# Patient Record
Sex: FEMALE | Race: WHITE | NOT HISPANIC OR LATINO | URBAN - METROPOLITAN AREA
[De-identification: names, ages, dates, MRNs, and addresses within clinical notes are randomized per-mention and may not be internally consistent; named-entity substitution may affect disease eponyms.]

---

## 2019-03-06 ENCOUNTER — OFFICE (OUTPATIENT)
Dept: URBAN - METROPOLITAN AREA CLINIC 101 | Facility: CLINIC | Age: 44
End: 2019-03-06

## 2019-03-06 VITALS
WEIGHT: 193 LBS | TEMPERATURE: 97.7 F | SYSTOLIC BLOOD PRESSURE: 131 MMHG | HEIGHT: 65 IN | DIASTOLIC BLOOD PRESSURE: 93 MMHG | HEART RATE: 92 BPM

## 2019-03-06 DIAGNOSIS — R14.0 ABDOMINAL DISTENSION (GASEOUS): ICD-10-CM

## 2019-03-06 DIAGNOSIS — F45.8 OTHER SOMATOFORM DISORDERS: ICD-10-CM

## 2019-03-06 DIAGNOSIS — R13.10 DYSPHAGIA, UNSPECIFIED: ICD-10-CM

## 2019-03-06 DIAGNOSIS — R10.13 EPIGASTRIC PAIN: ICD-10-CM

## 2019-03-06 PROCEDURE — 99204 OFFICE O/P NEW MOD 45 MIN: CPT

## 2019-03-06 NOTE — SERVICEHPINOTES
MAYKEL VILLASEÑOR   is a   43   year old    female who is being seen in consultation at the request of   LEONARD ADAM   for dysphagia and stomach pain. For years, had a lot of issues with the stomach. About 2-3 years, has been experiencing something stuck in the throat and trouble swallowing with solids. Painful swallowing with hot beverages at esophagus. Tried prevacid, Prilosec in the past which did not work. Never had an EGD. + Nausea without vomiting. Denies acid reflux. Stomach pain does not always correlate with food intake. Denies early satiety or weight loss. + Stomach bloating, especially with certain foods. BRTakes Meloxicam as needed for body pain. Knows it bother her stomach. Takes Ibuprofen as needed for pain as well.  Reports having a bowel movement everyday on BSS type 4. Occasionally 7 which is dietary related. Denies blood in stool, melena. + Rare constipation. Paternal grandmother had a history of colon cancer. Mother had polyps, not sure what age.

## 2019-04-17 ENCOUNTER — ON CAMPUS - OUTPATIENT (OUTPATIENT)
Dept: URBAN - METROPOLITAN AREA HOSPITAL 35 | Facility: HOSPITAL | Age: 44
End: 2019-04-17

## 2019-04-17 PROCEDURE — 43450 DILATE ESOPHAGUS 1/MULT PASS: CPT

## 2019-04-17 PROCEDURE — 43239 EGD BIOPSY SINGLE/MULTIPLE: CPT

## 2023-07-24 ENCOUNTER — NURSE ONLY (OUTPATIENT)
Age: 48
End: 2023-07-24

## 2023-07-24 ENCOUNTER — OFFICE VISIT (OUTPATIENT)
Age: 48
End: 2023-07-24
Payer: COMMERCIAL

## 2023-07-24 VITALS
SYSTOLIC BLOOD PRESSURE: 122 MMHG | HEART RATE: 90 BPM | WEIGHT: 174 LBS | TEMPERATURE: 98.5 F | DIASTOLIC BLOOD PRESSURE: 88 MMHG | OXYGEN SATURATION: 98 % | RESPIRATION RATE: 20 BRPM | BODY MASS INDEX: 28.99 KG/M2 | HEIGHT: 65 IN

## 2023-07-24 DIAGNOSIS — K21.9 GASTROESOPHAGEAL REFLUX DISEASE, UNSPECIFIED WHETHER ESOPHAGITIS PRESENT: ICD-10-CM

## 2023-07-24 DIAGNOSIS — E03.9 ACQUIRED HYPOTHYROIDISM: Primary | ICD-10-CM

## 2023-07-24 DIAGNOSIS — M32.8 OTHER FORMS OF SYSTEMIC LUPUS ERYTHEMATOSUS, UNSPECIFIED ORGAN INVOLVEMENT STATUS (HCC): ICD-10-CM

## 2023-07-24 DIAGNOSIS — J30.9 ALLERGIC RHINITIS, UNSPECIFIED SEASONALITY, UNSPECIFIED TRIGGER: ICD-10-CM

## 2023-07-24 DIAGNOSIS — Z12.31 ENCOUNTER FOR SCREENING MAMMOGRAM FOR MALIGNANT NEOPLASM OF BREAST: ICD-10-CM

## 2023-07-24 DIAGNOSIS — Z79.899 ENCOUNTER FOR LONG-TERM (CURRENT) USE OF MEDICATIONS: ICD-10-CM

## 2023-07-24 DIAGNOSIS — G43.909 MIGRAINE WITHOUT STATUS MIGRAINOSUS, NOT INTRACTABLE, UNSPECIFIED MIGRAINE TYPE: ICD-10-CM

## 2023-07-24 DIAGNOSIS — M79.7 FIBROMYALGIA: ICD-10-CM

## 2023-07-24 DIAGNOSIS — M35.00 SJOGREN'S SYNDROME WITHOUT EXTRAGLANDULAR INVOLVEMENT (HCC): ICD-10-CM

## 2023-07-24 DIAGNOSIS — Z12.11 SCREEN FOR COLON CANCER: ICD-10-CM

## 2023-07-24 DIAGNOSIS — R20.2 NUMBNESS AND TINGLING: ICD-10-CM

## 2023-07-24 DIAGNOSIS — K76.0 FATTY LIVER: ICD-10-CM

## 2023-07-24 DIAGNOSIS — R20.0 NUMBNESS AND TINGLING: ICD-10-CM

## 2023-07-24 PROCEDURE — 99203 OFFICE O/P NEW LOW 30 MIN: CPT | Performed by: FAMILY MEDICINE

## 2023-07-24 RX ORDER — LIOTHYRONINE SODIUM 5 UG/1
TABLET ORAL
COMMUNITY
Start: 2016-08-04

## 2023-07-24 RX ORDER — AMITRIPTYLINE HYDROCHLORIDE 10 MG/1
TABLET, FILM COATED ORAL
COMMUNITY

## 2023-07-24 RX ORDER — LEVOTHYROXINE SODIUM 0.07 MG/1
TABLET ORAL
COMMUNITY
Start: 2020-12-07

## 2023-07-24 RX ORDER — OMEPRAZOLE 40 MG/1
40 CAPSULE, DELAYED RELEASE ORAL DAILY
COMMUNITY

## 2023-07-24 RX ORDER — ALBUTEROL SULFATE 90 UG/1
AEROSOL, METERED RESPIRATORY (INHALATION)
COMMUNITY
Start: 2020-11-20

## 2023-07-24 RX ORDER — CETIRIZINE HYDROCHLORIDE 10 MG/1
10 TABLET ORAL DAILY
COMMUNITY

## 2023-07-24 RX ORDER — HYDROXYCHLOROQUINE SULFATE 200 MG/1
200 TABLET, FILM COATED ORAL 2 TIMES DAILY
COMMUNITY

## 2023-07-24 RX ORDER — AZELASTINE HCL 205.5 UG/1
SPRAY NASAL PRN
COMMUNITY
End: 2023-07-24 | Stop reason: SDUPTHER

## 2023-07-24 RX ORDER — ERGOCALCIFEROL 1.25 MG/1
CAPSULE ORAL
COMMUNITY
Start: 2023-06-05

## 2023-07-24 RX ORDER — AZELASTINE HCL 205.5 UG/1
2 SPRAY NASAL 2 TIMES DAILY
Qty: 30 ML | Refills: 5 | Status: SHIPPED | OUTPATIENT
Start: 2023-07-24

## 2023-07-24 RX ORDER — FLUTICASONE PROPIONATE 50 MCG
SPRAY, SUSPENSION (ML) NASAL
COMMUNITY

## 2023-07-24 RX ORDER — CYCLOBENZAPRINE HCL 5 MG
TABLET ORAL
COMMUNITY
Start: 2023-05-29

## 2023-07-24 RX ORDER — VIT C/B6/B5/MAGNESIUM/HERB 173 50-5-6-5MG
1500 CAPSULE ORAL DAILY
COMMUNITY

## 2023-07-24 RX ORDER — ELETRIPTAN HYDROBROMIDE 40 MG/1
TABLET, FILM COATED ORAL
Qty: 9 TABLET | Refills: 5 | Status: SHIPPED | OUTPATIENT
Start: 2023-07-24

## 2023-07-24 RX ORDER — ELETRIPTAN HYDROBROMIDE 40 MG/1
TABLET, FILM COATED ORAL
COMMUNITY
Start: 2021-01-22 | End: 2023-07-24 | Stop reason: SDUPTHER

## 2023-07-24 SDOH — ECONOMIC STABILITY: INCOME INSECURITY: HOW HARD IS IT FOR YOU TO PAY FOR THE VERY BASICS LIKE FOOD, HOUSING, MEDICAL CARE, AND HEATING?: NOT HARD AT ALL

## 2023-07-24 SDOH — ECONOMIC STABILITY: FOOD INSECURITY: WITHIN THE PAST 12 MONTHS, THE FOOD YOU BOUGHT JUST DIDN'T LAST AND YOU DIDN'T HAVE MONEY TO GET MORE.: NEVER TRUE

## 2023-07-24 SDOH — ECONOMIC STABILITY: HOUSING INSECURITY
IN THE LAST 12 MONTHS, WAS THERE A TIME WHEN YOU DID NOT HAVE A STEADY PLACE TO SLEEP OR SLEPT IN A SHELTER (INCLUDING NOW)?: NO

## 2023-07-24 SDOH — ECONOMIC STABILITY: FOOD INSECURITY: WITHIN THE PAST 12 MONTHS, YOU WORRIED THAT YOUR FOOD WOULD RUN OUT BEFORE YOU GOT MONEY TO BUY MORE.: NEVER TRUE

## 2023-07-24 ASSESSMENT — PATIENT HEALTH QUESTIONNAIRE - PHQ9
2. FEELING DOWN, DEPRESSED OR HOPELESS: 0
SUM OF ALL RESPONSES TO PHQ QUESTIONS 1-9: 0
SUM OF ALL RESPONSES TO PHQ QUESTIONS 1-9: 0
SUM OF ALL RESPONSES TO PHQ9 QUESTIONS 1 & 2: 0
SUM OF ALL RESPONSES TO PHQ QUESTIONS 1-9: 0
1. LITTLE INTEREST OR PLEASURE IN DOING THINGS: 0
SUM OF ALL RESPONSES TO PHQ QUESTIONS 1-9: 0

## 2023-07-25 LAB
ALBUMIN SERPL-MCNC: 3.8 G/DL (ref 3.5–5)
ALBUMIN/GLOB SERPL: 1.4 (ref 1.1–2.2)
ALP SERPL-CCNC: 52 U/L (ref 45–117)
ALT SERPL-CCNC: 23 U/L (ref 12–78)
ANION GAP SERPL CALC-SCNC: 6 MMOL/L (ref 5–15)
AST SERPL-CCNC: 11 U/L (ref 15–37)
BASOPHILS # BLD: 0 K/UL (ref 0–0.1)
BASOPHILS NFR BLD: 1 % (ref 0–1)
BILIRUB SERPL-MCNC: 0.3 MG/DL (ref 0.2–1)
BUN SERPL-MCNC: 17 MG/DL (ref 6–20)
BUN/CREAT SERPL: 20 (ref 12–20)
CALCIUM SERPL-MCNC: 9.1 MG/DL (ref 8.5–10.1)
CHLORIDE SERPL-SCNC: 107 MMOL/L (ref 97–108)
CO2 SERPL-SCNC: 28 MMOL/L (ref 21–32)
CREAT SERPL-MCNC: 0.86 MG/DL (ref 0.55–1.02)
DIFFERENTIAL METHOD BLD: ABNORMAL
EOSINOPHIL # BLD: 0.2 K/UL (ref 0–0.4)
EOSINOPHIL NFR BLD: 4 % (ref 0–7)
ERYTHROCYTE [DISTWIDTH] IN BLOOD BY AUTOMATED COUNT: 12.4 % (ref 11.5–14.5)
GLOBULIN SER CALC-MCNC: 2.8 G/DL (ref 2–4)
GLUCOSE SERPL-MCNC: 83 MG/DL (ref 65–100)
HCT VFR BLD AUTO: 40.8 % (ref 35–47)
HGB BLD-MCNC: 13.1 G/DL (ref 11.5–16)
IMM GRANULOCYTES # BLD AUTO: 0 K/UL (ref 0–0.04)
IMM GRANULOCYTES NFR BLD AUTO: 0 % (ref 0–0.5)
LYMPHOCYTES # BLD: 1.7 K/UL (ref 0.8–3.5)
LYMPHOCYTES NFR BLD: 42 % (ref 12–49)
MCH RBC QN AUTO: 30.6 PG (ref 26–34)
MCHC RBC AUTO-ENTMCNC: 32.1 G/DL (ref 30–36.5)
MCV RBC AUTO: 95.3 FL (ref 80–99)
MONOCYTES # BLD: 0.4 K/UL (ref 0–1)
MONOCYTES NFR BLD: 9 % (ref 5–13)
NEUTS SEG # BLD: 1.7 K/UL (ref 1.8–8)
NEUTS SEG NFR BLD: 44 % (ref 32–75)
NRBC # BLD: 0 K/UL (ref 0–0.01)
NRBC BLD-RTO: 0 PER 100 WBC
PLATELET # BLD AUTO: 182 K/UL (ref 150–400)
PMV BLD AUTO: 12.1 FL (ref 8.9–12.9)
POTASSIUM SERPL-SCNC: 4.7 MMOL/L (ref 3.5–5.1)
PROT SERPL-MCNC: 6.6 G/DL (ref 6.4–8.2)
RBC # BLD AUTO: 4.28 M/UL (ref 3.8–5.2)
SODIUM SERPL-SCNC: 141 MMOL/L (ref 136–145)
WBC # BLD AUTO: 3.9 K/UL (ref 3.6–11)

## 2023-07-26 ENCOUNTER — TELEPHONE (OUTPATIENT)
Age: 48
End: 2023-07-26

## 2023-07-26 NOTE — TELEPHONE ENCOUNTER
----- Message from Adam Fernandez MD sent at 7/25/2023  8:43 PM EDT -----  Labs show normal blood cell counts. Normal electrolytes, blood sugar, kidney, and liver function tests.

## 2023-09-07 ENCOUNTER — TELEPHONE (OUTPATIENT)
Age: 48
End: 2023-09-07

## 2023-09-07 RX ORDER — LEVOTHYROXINE SODIUM 0.07 MG/1
75 TABLET ORAL DAILY
Qty: 90 TABLET | Refills: 0 | Status: CANCELLED | OUTPATIENT
Start: 2023-09-07

## 2023-09-07 RX ORDER — LIOTHYRONINE SODIUM 5 UG/1
5 TABLET ORAL DAILY
Qty: 90 TABLET | Refills: 0 | Status: CANCELLED | OUTPATIENT
Start: 2023-09-07

## 2023-09-07 NOTE — TELEPHONE ENCOUNTER
Please call pt to double check her dose of Cytomel. Is it 10 mcg which would be 2 x 5 mcg tab daily? There is no 10 mcg tab for Cytomel. Does she use generic?

## 2023-09-08 ENCOUNTER — PATIENT MESSAGE (OUTPATIENT)
Age: 48
End: 2023-09-08

## 2023-09-08 DIAGNOSIS — E03.9 ACQUIRED HYPOTHYROIDISM: Primary | ICD-10-CM

## 2023-09-12 RX ORDER — LIOTHYRONINE SODIUM 5 UG/1
5 TABLET ORAL 2 TIMES DAILY
Qty: 180 TABLET | Refills: 1 | Status: SHIPPED | OUTPATIENT
Start: 2023-09-12

## 2023-09-12 RX ORDER — LEVOTHYROXINE SODIUM 0.07 MG/1
TABLET ORAL
Qty: 30 TABLET | Status: CANCELLED | OUTPATIENT
Start: 2023-09-12

## 2023-09-12 RX ORDER — LEVOTHYROXINE SODIUM 75 MCG
75 TABLET ORAL DAILY
Qty: 90 TABLET | Refills: 1 | Status: SHIPPED | OUTPATIENT
Start: 2023-09-12

## 2023-09-13 NOTE — TELEPHONE ENCOUNTER
----- Message from [address] sent at 9/12/2023  3:03 PM EDT -----  Regarding: Medications  Contact: [phone]  Hi there I apologize it is 5 mcg and take 2 daily. I uselly get generic  in the Cytomel. I take Synthroid Brand name only 75 mcg. 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 I apologize not being able to answer calls.
Refills sent to [facility].
Detailed exam

## 2023-09-25 ENCOUNTER — HOSPITAL ENCOUNTER (OUTPATIENT)
Facility: HOSPITAL | Age: 48
Discharge: HOME | End: 2023-09-28
Attending: FAMILY MEDICINE
Payer: COMMERCIAL

## 2023-09-25 DIAGNOSIS — Z12.31 ENCOUNTER FOR SCREENING MAMMOGRAM FOR MALIGNANT NEOPLASM OF BREAST: ICD-10-CM

## 2023-09-25 PROCEDURE — 77063 BREAST TOMOSYNTHESIS BI: CPT

## 2023-10-17 ENCOUNTER — TELEPHONE (OUTPATIENT)
Age: 48
End: 2023-10-17

## 2023-10-26 ENCOUNTER — TELEPHONE (OUTPATIENT)
Age: 48
End: 2023-10-26

## 2023-10-26 DIAGNOSIS — J30.9 ALLERGIC RHINITIS, UNSPECIFIED SEASONALITY, UNSPECIFIED TRIGGER: Primary | ICD-10-CM

## 2023-10-26 RX ORDER — IPRATROPIUM BROMIDE 42 UG/1
2 SPRAY, METERED NASAL 4 TIMES DAILY
Qty: 15 ML | Refills: 3 | Status: SHIPPED | OUTPATIENT
Start: 2023-10-26

## 2023-10-27 ENCOUNTER — TELEPHONE (OUTPATIENT)
Age: 48
End: 2023-10-27

## 2023-11-06 ENCOUNTER — OFFICE VISIT (OUTPATIENT)
Age: 48
End: 2023-11-06

## 2023-11-06 VITALS
HEART RATE: 90 BPM | RESPIRATION RATE: 18 BRPM | TEMPERATURE: 98.7 F | OXYGEN SATURATION: 98 % | WEIGHT: 171 LBS | DIASTOLIC BLOOD PRESSURE: 70 MMHG | HEIGHT: 65 IN | SYSTOLIC BLOOD PRESSURE: 132 MMHG | BODY MASS INDEX: 28.49 KG/M2

## 2023-11-06 DIAGNOSIS — L03.211 CELLULITIS OF FACE: Primary | ICD-10-CM

## 2023-11-06 RX ORDER — FLUCONAZOLE 150 MG/1
150 TABLET ORAL ONCE
Qty: 1 TABLET | Refills: 0 | Status: SHIPPED | OUTPATIENT
Start: 2023-11-06 | End: 2023-11-06

## 2023-11-06 RX ORDER — DOXYCYCLINE HYCLATE 100 MG
100 TABLET ORAL 2 TIMES DAILY
Qty: 20 TABLET | Refills: 0 | Status: SHIPPED | OUTPATIENT
Start: 2023-11-06 | End: 2023-11-16

## 2023-11-06 ASSESSMENT — ENCOUNTER SYMPTOMS
COLOR CHANGE: 1
EYE PAIN: 0

## 2023-11-06 NOTE — PROGRESS NOTES
Subjective     Chief Complaint   Patient presents with    Acne     Covid pos Wednesday, has bump on bridge of the nose, swelling between the eyes that has been going to the lower part of the face noticed when got sick, bothering the eyes making them feel itchy and says the area is very painful        Patient ID:  Treasure Rainey is a 57 y.o. female. Patient began having COVID symptoms last week and tested positive on Wednesday. She states that this week she noticed an area of redness on the bridge of her nose that has had some drainage. She felt as if her face were huang this morning. She has a history of MRSA and works in a hospital.          Review of Systems   Constitutional:  Negative for chills and fever. Eyes:  Negative for pain. Skin:  Positive for color change and wound. Past Medical History:   Diagnosis Date    Fibromyalgia     H/O Sjogren's disease ([address])     Hashimoto's disease     Lupus ([address])        Past Surgical History:   Procedure Laterality Date    LAPAROSCOPY      RIght overy and tube removed and endeometriosis removal       Family History   Problem Relation Age of Onset    High Blood Pressure Mother     COPD Mother     Rheum Arthritis Mother     High Blood Pressure Father        Allergies   Allergen Reactions    Latex Rash     Red rash and itching  Red rash and itching      Morphine Anaphylaxis, Other (See Comments) and Shortness Of Breath     Cannot breathe  Cannot breathe      Penicillins Hives, Rash and Other (See Comments)     Childhood allergy test  In childhood  In childhood      Sulfa Antibiotics Nausea And Vomiting, Hives and Rash    Cimetidine Other (See Comments) and Hives     Does not remember   Does not remember   unkwown  unkwown      Ranitidine Hcl Nausea And Vomiting    Clindamycin Phosphate     Other Swelling     Eye dilating drops.  Eye swelling    Prednisone     Codeine Nausea And Vomiting and Other (See Comments)     migraine  Vomiting  Headache and

## 2023-11-07 ENCOUNTER — HOSPITAL ENCOUNTER (EMERGENCY)
Facility: HOSPITAL | Age: 48
Discharge: HOME | End: 2023-11-07
Attending: EMERGENCY MEDICINE
Payer: COMMERCIAL

## 2023-11-07 VITALS
OXYGEN SATURATION: 99 % | HEART RATE: 97 BPM | DIASTOLIC BLOOD PRESSURE: 94 MMHG | HEIGHT: 65 IN | BODY MASS INDEX: 28.32 KG/M2 | RESPIRATION RATE: 18 BRPM | TEMPERATURE: 98.9 F | SYSTOLIC BLOOD PRESSURE: 143 MMHG | WEIGHT: 169.97 LBS

## 2023-11-07 DIAGNOSIS — L03.211 FACIAL CELLULITIS: Primary | ICD-10-CM

## 2023-11-07 PROCEDURE — 99283 EMERGENCY DEPT VISIT LOW MDM: CPT

## 2023-11-07 RX ORDER — CETIRIZINE HYDROCHLORIDE 10 MG/1
10 TABLET ORAL DAILY
COMMUNITY

## 2023-11-07 RX ORDER — CLINDAMYCIN HYDROCHLORIDE 150 MG/1
450 CAPSULE ORAL 4 TIMES DAILY
Qty: 120 CAPSULE | Refills: 0 | Status: SHIPPED | OUTPATIENT
Start: 2023-11-07 | End: 2023-11-17

## 2023-11-07 RX ORDER — L.ACIDOPHIL/L.PLANTAR/BIFIDO 7 15B CELL
1 CAPSULE ORAL DAILY
COMMUNITY

## 2023-11-07 ASSESSMENT — ENCOUNTER SYMPTOMS
COLOR CHANGE: 1
SINUS PRESSURE: 1

## 2023-11-07 ASSESSMENT — PAIN - FUNCTIONAL ASSESSMENT: PAIN_FUNCTIONAL_ASSESSMENT: 0-10

## 2023-11-07 NOTE — ED NOTES
Dr. Kobi Moffett reviewed discharge instructions with the patient. The patient verbalized understanding. All questions and concerns were addressed. The patient declined a wheelchair and is discharged ambulatory in the care of family members with instructions and prescriptions in hand. Pt is alert and oriented x 4. Respirations are clear and unlabored.        Td Ascencio RN  11/07/23 [id number]

## 2023-11-07 NOTE — ED TRIAGE NOTES
57 year female with swelling to nose. Pt has wound for week. Pt reports that swelling and redness and advised to come to the ED. Seen at urgent care and started on Doxycycline and topical antibiotic.

## 2023-11-07 NOTE — ED PROVIDER NOTES
[facility] EMERGENCY DEPT  EMERGENCY DEPARTMENT ENCOUNTER      Pt Name: Maryanne Maza  MRN: [id number]  [address] [facility] 1975  Date of evaluation: 11/7/2023  Provider: Michael Rae MD    CHIEF COMPLAINT       Chief Complaint   Patient presents with    Wound Infection         HISTORY OF PRESENT ILLNESS   (Location/Symptom, Timing/Onset, Context/Setting, Quality, Duration, Modifying Factors, Severity)  Note limiting factors. Patient is a 26-year-old with history of autoimmune disease who is currently on Plaquenil. She comes into the emergency department because she has an infected wound on her nose that has not improved with doxycycline. Patient reports that she was seen yesterday and diagnosed with the infection, she started taking doxycycline and using mupirocin ointment and while her pain has improved the erythema has remained. When she checked in with the urgent care that prescribed the antibiotics they instructed her to come into the emergency department for evaluation. The patient denies worsening symptoms and reports that she has been on the antibiotics for approximately 24 hours. She does have a history of abscesses in the past.  Unrelatedly, the patient was recently diagnosed with a COVID-19 infection. The history is provided by the patient. Review of External Medical Records:     Nursing Notes were reviewed. REVIEW OF SYSTEMS    (2-9 systems for level 4, 10 or more for level 5)     Review of Systems   Constitutional:  Negative for chills and fever. HENT:  Positive for sinus pressure. Skin:  Positive for color change and wound. Neurological:  Positive for headaches. All other systems reviewed and are negative. Except as noted above the remainder of the review of systems was reviewed and negative.        PAST MEDICAL HISTORY     Past Medical History:   Diagnosis Date    Fibromyalgia     H/O Sjogren's disease ([address] )     Hashimoto's disease     Lupus ([address])          SURGICAL

## 2023-11-08 ENCOUNTER — TELEPHONE (OUTPATIENT)
Age: 48
End: 2023-11-08

## 2023-11-08 NOTE — TELEPHONE ENCOUNTER
Pt called and was seen in the ER last night for a bad infection on the nose and needs an ED follow up but there was nothing until 11/24 and they said she should be seen this week. Do I fit her in somewhere or do the next available?

## 2023-11-09 ENCOUNTER — TELEPHONE (OUTPATIENT)
Age: 48
End: 2023-11-09

## 2023-11-09 NOTE — TELEPHONE ENCOUNTER
Patient called to let us know that she still has Conjunctivitis, and she was not prescribed meds during her visit on Monday. Can you please send an rx for her? Thank you.

## 2023-11-20 RX ORDER — AMITRIPTYLINE HYDROCHLORIDE 10 MG/1
TABLET, FILM COATED ORAL
Qty: 90 TABLET | Refills: 0 | Status: SHIPPED | OUTPATIENT
Start: 2023-11-20

## 2024-01-21 DIAGNOSIS — E03.9 ACQUIRED HYPOTHYROIDISM: ICD-10-CM

## 2024-01-21 RX ORDER — LIOTHYRONINE SODIUM 5 UG/1
5 TABLET ORAL 2 TIMES DAILY
Qty: 180 TABLET | Refills: 1 | OUTPATIENT
Start: 2024-01-21

## 2024-01-29 ENCOUNTER — TELEPHONE (OUTPATIENT)
Age: 49
End: 2024-01-29

## 2024-01-29 ENCOUNTER — OFFICE VISIT (OUTPATIENT)
Age: 49
End: 2024-01-29
Payer: COMMERCIAL

## 2024-01-29 VITALS
SYSTOLIC BLOOD PRESSURE: 120 MMHG | OXYGEN SATURATION: 99 % | BODY MASS INDEX: 29.24 KG/M2 | WEIGHT: 173 LBS | DIASTOLIC BLOOD PRESSURE: 88 MMHG | HEART RATE: 87 BPM

## 2024-01-29 DIAGNOSIS — R51.9 CHRONIC HEADACHE WITHOUT NEW FEATURES: ICD-10-CM

## 2024-01-29 DIAGNOSIS — M54.32 SCIATIC PAIN, LEFT: ICD-10-CM

## 2024-01-29 DIAGNOSIS — G43.011 INTRACTABLE MIGRAINE WITHOUT AURA AND WITH STATUS MIGRAINOSUS: Primary | ICD-10-CM

## 2024-01-29 DIAGNOSIS — G56.03 BILATERAL CARPAL TUNNEL SYNDROME: ICD-10-CM

## 2024-01-29 DIAGNOSIS — D35.2 PITUITARY MICROADENOMA (HCC): ICD-10-CM

## 2024-01-29 DIAGNOSIS — G89.29 CHRONIC HEADACHE WITHOUT NEW FEATURES: ICD-10-CM

## 2024-01-29 DIAGNOSIS — M54.31 SCIATIC PAIN, RIGHT: ICD-10-CM

## 2024-01-29 PROCEDURE — 99204 OFFICE O/P NEW MOD 45 MIN: CPT | Performed by: PSYCHIATRY & NEUROLOGY

## 2024-01-29 RX ORDER — RIMEGEPANT SULFATE 75 MG/75MG
TABLET, ORALLY DISINTEGRATING ORAL
Qty: 16 TABLET | Refills: 5 | Status: SHIPPED | OUTPATIENT
Start: 2024-01-29

## 2024-01-29 NOTE — PATIENT INSTRUCTIONS
[place] [place] [facility] Test Result Communication    Test results are available in judo.  [facility] is the patient portal into our electronic health record.  This feature allows patients to see diagnostic test results, immunizations, allergies, past medical and surgical history, current medications, and send messages directly to providers.  Our team members at the  can provide additional information and assist with registration.  The judo support team can be reached at [phone].    In some cases, a provider might need time to explain the results in detail during a follow-up appointment.  This might include additional information or context that will help patients understand the reason for next steps in the plan of care recommended by their provider.    If a patient chooses to receive diagnostic testing at an imaging center outside of the [facility] network, it is the patient's responsibility to bring the imaging report and disc to their [facility] follow-up appointment.    If the test results reveal anything that is particularly noteworthy, we will contact you to discuss the matter and, if necessary, schedule a follow-up appointment at an earlier date.    If you have not received your test results by judo or other communication within 7 days, please contact our office.  An inquiry can be sent to your provider using judo.  Alternatively, appointments can be scheduled via telephone to review results.  If a follow-up appointment to review results has not been scheduled, Our [facility] office can be reached at [phone].  For appointments at our [place] or [place] office, please call [phone].       PRESCRIPTION REFILL POLICY    [facility]   Statement to Patients  April 1, 2014      In an effort to ensure the large volume of patient prescription refills is processed in the most efficient and expeditious

## 2024-01-29 NOTE — TELEPHONE ENCOUNTER
Re: [employer]    Created case in CMM Key# [id number], submitted and rcvd message back that med is available without auth, sent update to nurse.

## 2024-01-29 NOTE — PROGRESS NOTES
Migraines, currently taking elavil  Said they come in like clusters  Said injectables were too expensive  Has seen neuro, unsure who said it was in [place]  Mentions if she laughs too hard will have a pressure in the back of her head  Numbness left leg, sciatic area

## 2024-01-29 NOTE — PROGRESS NOTES
[facility] at [facility] at [address] [place] [address] [address] [place] [address] [phone] Office  [phone] Facsimile           Referring: Yessy Lugo MD  [address] [place] [address]     Chief Complaint   Patient presents with    New Patient     Migraines and numbness in toes and fingers     48-year-old lady presents today for neurologic consultation regarding migraine headache.  She began having migraines as a child.  Over the years they have worsened.  She notes typically they are in the back of the head and neck and her forehead.  Intense pain with associated photophobia phonophobia nausea and vomiting.  She gets at least 2 a week and they can last days but on average last at least 24 hours.  She has family history of migraine with her mother.  No focal deficits.  Relpax is ineffective.  She does have a history of pituitary microadenoma discovered on MRI in 2011.  She says back then she saw a neurosurgeon she was getting yearly follow-up.  They were discussing medications.  She then was lost to follow-up.  She has not had a follow-up MRI.  She is concerned because she is now having a new type of headache where if she laughs or bends over she gets intense pain in the back of her head that builds into the point that she just has to stop in her tracks.    Previous abortives prescribed  Imitrex  Maxalt  Relpax    Previous preventatives prescribed  An injectable but too expensive  Propranolol  Elavil    Additionally she has sciatic pain ongoing and worsening.  Left greater than right.  No weakness.  She has numbness and tingling of her hands.  They will awaken her at night.  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  She gets is when she is driving.

## 2024-02-06 ENCOUNTER — TELEPHONE (OUTPATIENT)
Age: 49
End: 2024-02-06

## 2024-02-06 NOTE — TELEPHONE ENCOUNTER
Patient is requesting a call to try and r/s her EMG on 02/15 for later that day or the next day (02/16).     Please contact. States she is working so might miss call. Please leave voicemail with details of appt.

## 2024-02-07 DIAGNOSIS — J30.9 ALLERGIC RHINITIS, UNSPECIFIED SEASONALITY, UNSPECIFIED TRIGGER: ICD-10-CM

## 2024-02-07 RX ORDER — IPRATROPIUM BROMIDE 42 UG/1
2 SPRAY, METERED NASAL 4 TIMES DAILY
Qty: 15 ML | Refills: 3 | Status: SHIPPED | OUTPATIENT
Start: 2024-02-07

## 2024-02-09 ENCOUNTER — PROCEDURE VISIT (OUTPATIENT)
Age: 49
End: 2024-02-09

## 2024-02-09 DIAGNOSIS — R20.2 PARESTHESIA: Primary | ICD-10-CM

## 2024-02-09 NOTE — PROGRESS NOTES
EMG/ NCS Report  [facility] [address], [address]  [place]  [address]   Ph: [phone]   FAX: [phone]/ [phone]  Test Date:  2019      Test Date:  2024    Patient: IGNACIO CASTREJON : 1975 Physician: aSmi Lyon MD   Sex: Female Height: ' \" Ref Phys: EFREN HE MD   ID#: [id number] Weight:  lbs. Technician: Ct Arora     Patient History / Exam:  Patient comes in with numbness of legs with lower back pain. (+) Fibromyalgia    Patient is coming for neuropathy evaluation.          EMG & NCV Findings:  Evaluation of the left Fibular motor and the right Fibular motor nerves showed normal distal onset latency (L6.3, R4.3 ms), normal amplitude (L3.1, R6.7 mV), normal conduction velocity (B Fib-Ankle, L53, R50 m/s), and normal conduction velocity (Poplt-B Fib, L42, R53 m/s).  The left tibial motor and the right tibial motor nerves showed normal distal onset latency (L4.7, R4.5 ms), normal amplitude (L6.8, R11.6 mV), and normal conduction velocity (Knee-Ankle, L46, R39 m/s).  The left Sup Fibular sensory and the right Sup Fibular sensory nerves showed normal distal peak latency (L2.3, R2.5 ms), normal amplitude (L31.0, R17.1 µV), and normal conduction velocity (Lower leg-Lat ankle, L56, R48 m/s).  The left sural sensory and the right sural sensory nerves showed normal distal peak latency (L3.5, R3.3 ms) and normal amplitude (L10.5, R14.2 µV).  All left vs. right side differences were within normal limits.      All F Wave latencies were within normal limits.  All F Wave left vs. right side latency differences were within normal limits.  All H Reflex left vs. right side latency differences were within normal limits.          Impression:    Patient refused needle examination due to her health concerns. Cannot exclude a lumbosacral motor radiculopathy.    Nerve conduction study of the right and left lower extremities is

## 2024-02-20 ENCOUNTER — TELEPHONE (OUTPATIENT)
Age: 49
End: 2024-02-20

## 2024-02-21 ENCOUNTER — OFFICE VISIT (OUTPATIENT)
Age: 49
End: 2024-02-21
Payer: COMMERCIAL

## 2024-02-21 VITALS
HEIGHT: 65 IN | RESPIRATION RATE: 16 BRPM | BODY MASS INDEX: 28.72 KG/M2 | TEMPERATURE: 98 F | DIASTOLIC BLOOD PRESSURE: 78 MMHG | SYSTOLIC BLOOD PRESSURE: 110 MMHG | HEART RATE: 96 BPM | WEIGHT: 172.4 LBS | OXYGEN SATURATION: 98 %

## 2024-02-21 DIAGNOSIS — R09.81 SINUS CONGESTION: Primary | ICD-10-CM

## 2024-02-21 PROCEDURE — 99214 OFFICE O/P EST MOD 30 MIN: CPT | Performed by: FAMILY MEDICINE

## 2024-02-21 RX ORDER — DOXYCYCLINE HYCLATE 100 MG
100 TABLET ORAL 2 TIMES DAILY
Qty: 14 TABLET | Refills: 0 | Status: SHIPPED | OUTPATIENT
Start: 2024-02-21 | End: 2024-02-28

## 2024-02-21 RX ORDER — FLUCONAZOLE 150 MG/1
150 TABLET ORAL
Qty: 2 TABLET | Refills: 0 | Status: SHIPPED | OUTPATIENT
Start: 2024-02-21 | End: 2024-02-27

## 2024-02-21 ASSESSMENT — PATIENT HEALTH QUESTIONNAIRE - PHQ9
SUM OF ALL RESPONSES TO PHQ QUESTIONS 1-9: 0
2. FEELING DOWN, DEPRESSED OR HOPELESS: 0
SUM OF ALL RESPONSES TO PHQ QUESTIONS 1-9: 0
1. LITTLE INTEREST OR PLEASURE IN DOING THINGS: 0
SUM OF ALL RESPONSES TO PHQ9 QUESTIONS 1 & 2: 0
SUM OF ALL RESPONSES TO PHQ QUESTIONS 1-9: 0
SUM OF ALL RESPONSES TO PHQ QUESTIONS 1-9: 0

## 2024-02-21 NOTE — PROGRESS NOTES
Identified pt with two pt identifiers(name and ).    Chief Complaint   Patient presents with    Congestion    Sinusitis     Pt had a cold and she thinks she now has a sinus infection.         Health Maintenance Due   Topic    Hepatitis B vaccine (1 of 3 - 3-dose series)    HIV screen     Hepatitis C screen     DTaP/Tdap/Td vaccine (1 - Tdap)    Cervical cancer screen     Lipids     Colorectal Cancer Screen     Flu vaccine (1)    COVID-19 Vaccine (3 - 2023-24 season)       Wt Readings from Last 3 Encounters:   24 78.2 kg (172 lb 6.4 oz)   24 78.5 kg (173 lb)   23 77.1 kg (169 lb 15.6 oz)     Temp Readings from Last 3 Encounters:   24 98 °F (36.7 °C) (Temporal)   23 98.9 °F (37.2 °C) (Tympanic)   23 98.7 °F (37.1 °C) (Oral)     BP Readings from Last 3 Encounters:   24 110/78   24 120/88   23 (!) 143/94     Pulse Readings from Last 3 Encounters:   24 96   24 87   23 97           Depression Screening:  :         2024     4:48 PM 2023     9:25 AM   PHQ-9 Questionaire   Little interest or pleasure in doing things 0 0   Feeling down, depressed, or hopeless 0 0   PHQ-9 Total Score 0 0        Fall Risk Assessment:  :          No data to display                 Abuse Screening:  :          No data to display                 Coordination of Care Questionnaire:  :     \"Have you been to the ER, urgent care clinic since your last visit?  Hospitalized since your last visit?\"    NO    “Have you seen or consulted any other health care providers outside of [facility] since your last visit?”    NO    “Have you had a colorectal cancer screening such as a colonoscopy/FIT/Cologuard?    NO      “Have you had a pap smear?”    YES - [facility]: [facility]  Nurse/CMA to request most recent records if not in the chart

## 2024-03-06 ENCOUNTER — TELEPHONE (OUTPATIENT)
Age: 49
End: 2024-03-06

## 2024-03-29 ENCOUNTER — TELEPHONE (OUTPATIENT)
Age: 49
End: 2024-03-29

## 2024-04-27 RX ORDER — OMEPRAZOLE 40 MG/1
CAPSULE, DELAYED RELEASE ORAL
Qty: 90 CAPSULE | Refills: 3 | Status: SHIPPED | OUTPATIENT
Start: 2024-04-27

## 2024-05-08 ENCOUNTER — OFFICE VISIT (OUTPATIENT)
Age: 49
End: 2024-05-08
Payer: COMMERCIAL

## 2024-05-08 VITALS
WEIGHT: 172 LBS | RESPIRATION RATE: 16 BRPM | TEMPERATURE: 98.8 F | HEIGHT: 65 IN | OXYGEN SATURATION: 98 % | SYSTOLIC BLOOD PRESSURE: 110 MMHG | HEART RATE: 88 BPM | BODY MASS INDEX: 28.66 KG/M2 | DIASTOLIC BLOOD PRESSURE: 72 MMHG

## 2024-05-08 DIAGNOSIS — Z13.220 SCREENING FOR HYPERLIPIDEMIA: ICD-10-CM

## 2024-05-08 DIAGNOSIS — Z11.59 NEED FOR HEPATITIS C SCREENING TEST: ICD-10-CM

## 2024-05-08 DIAGNOSIS — L98.9 FACIAL SKIN LESION: ICD-10-CM

## 2024-05-08 DIAGNOSIS — Z00.00 ENCOUNTER FOR WELL ADULT EXAM WITHOUT ABNORMAL FINDINGS: Primary | ICD-10-CM

## 2024-05-08 DIAGNOSIS — M32.8 OTHER FORMS OF SYSTEMIC LUPUS ERYTHEMATOSUS, UNSPECIFIED ORGAN INVOLVEMENT STATUS (HCC): ICD-10-CM

## 2024-05-08 DIAGNOSIS — Z11.4 SCREENING FOR HIV WITHOUT PRESENCE OF RISK FACTORS: ICD-10-CM

## 2024-05-08 DIAGNOSIS — Z12.11 SCREEN FOR COLON CANCER: ICD-10-CM

## 2024-05-08 DIAGNOSIS — M25.551 PAIN OF RIGHT HIP: ICD-10-CM

## 2024-05-08 DIAGNOSIS — E55.9 VITAMIN D DEFICIENCY: ICD-10-CM

## 2024-05-08 PROCEDURE — 99396 PREV VISIT EST AGE 40-64: CPT | Performed by: FAMILY MEDICINE

## 2024-05-08 NOTE — PROGRESS NOTES
Identified pt with two pt identifiers(name and ).    Chief Complaint   Patient presents with    Well Adult Visit    Discuss Labs     Would like to discuss labs from endocrinologist  Was told she had low cortisol     Pain     Right hip pain that started in March        Health Maintenance Due   Topic    Hepatitis B vaccine (1 of 3 - 3-dose series)    HIV screen     Hepatitis C screen     DTaP/Tdap/Td vaccine (1 - Tdap)    Cervical cancer screen     Lipids     Colorectal Cancer Screen     COVID-19 Vaccine (3 - 2023-24 season)       Wt Readings from Last 3 Encounters:   24 78 kg (172 lb)   24 78.2 kg (172 lb 6.4 oz)   24 78.5 kg (173 lb)     Temp Readings from Last 3 Encounters:   24 98.8 °F (37.1 °C) (Temporal)   24 98 °F (36.7 °C) (Temporal)   23 98.9 °F (37.2 °C) (Tympanic)     BP Readings from Last 3 Encounters:   24 110/72   24 110/78   24 120/88     Pulse Readings from Last 3 Encounters:   24 88   24 96   24 87           Depression Screening:  :         2024     4:48 PM 2023     9:25 AM   PHQ-9 Questionaire   Little interest or pleasure in doing things 0 0   Feeling down, depressed, or hopeless 0 0   PHQ-9 Total Score 0 0        Fall Risk Assessment:  :          No data to display                 Abuse Screening:  :          No data to display                 Coordination of Care Questionnaire:  :     \"Have you been to the ER, urgent care clinic since your last visit?  Hospitalized since your last visit?\"    NO    “Have you seen or consulted any other health care providers outside of [facility] since your last visit?”    NO    “Have you had a colorectal cancer screening such as a colonoscopy/FIT/Cologuard?    NO    No colonoscopy on file  No cologuard on file  No FIT/FOBT on file   No flexible sigmoidoscopy on file         “Have you had a pap smear?”    Dr Nate Castro     No cervical cancer screening on file

## 2024-05-08 NOTE — PATIENT INSTRUCTIONS
saturated fat free, and trans fat freeAlso scan the Nutrition Facts Label, which lists saturated fat, trans fat, and cholesterol amounts.   For products low in sodium, look for sodium free, very low sodium, low sodium, no added salt, and unsalted   Skip the salt when cooking or at the table; if food needs more flavor, get creative and try out different herbs and spices. Garlic and onion also add substantial flavor to foods.    Trim any visible fat off meat and poultry before cooking, and drain the fat off after de la garza.    Use cooking methods that require little or no added fat, such as grilling, boiling, baking, poaching, broiling, roasting, steaming, stir-frying, and sauting.    Avoid fast food and convenience food. They tend to be high in saturated and trans fat and have a lot of added salt.    Talk to a registered dietitian for individualized diet advice.      Last Reviewed: March 2011 Cathleen Gamboa MS, MPH, RD   Updated: 3/29/2011

## 2024-05-08 NOTE — PROGRESS NOTES
Well Adult Note  Name: Chey Lay Today’s Date: 2024   MRN: [id number] Sex: Female   Age: 48 y.o. Ethnicity: Non- / Non    : 1975 Race: White (non-)      Chey Lay is here for well adult exam.  History:  Discuss lab results from [facility]. Hx SLE. Followed by [facility] Dr Estrada.  Had visit with Neurology Dr Mirza for migraines and neuropathy.  Patient brought in labs from [facility] that were drawn 2024 and 2023.  Abnormal thyroid tests with low TSH.  Also elevated lipids from 2023.  Mild low WBC count.  Mild low cortisol.  Normal pituitary hormones.  She will follow-up with endocrinology later this summer.      Review of Systems   Musculoskeletal:  Positive for arthralgias (R hip strain in March. relief with Aleve.).       Allergies   Allergen Reactions    Latex Rash     Red rash and itching  Red rash and itching      Morphine Anaphylaxis, Other (See Comments) and Shortness Of Breath     Cannot breathe  Cannot breathe      Penicillins Hives, Rash and Other (See Comments)     Childhood allergy test  In childhood  In childhood      Sulfa Antibiotics Nausea And Vomiting, Hives and Rash    Cimetidine Other (See Comments) and Hives     Does not remember   Does not remember   unkwown  unkwown      Ranitidine Hcl Nausea And Vomiting    Clindamycin Phosphate     Other Swelling     Eye dilating drops. Eye swelling    Prednisone     Codeine Nausea And Vomiting and Other (See Comments)     migraine  Vomiting  Headache and vomiting  Headache and vomiting  Vomiting      Trimethoprim Nausea And Vomiting         Prior to Visit Medications    Medication Sig Taking? Authorizing Provider   omeprazole (PRILOSEC) 40 MG delayed release capsule TAKE 1 CAPSULE BY MOUTH ONCE A DAY 1/2 HOUR BEFORE BREAKFAST Yes Padmini Montague MD   ipratropium (ATROVENT) 0.06 % nasal spray 2 sprays by Each Nostril route 4 times daily Yes Yessy Lugo MD   Rimegepant Sulfate (NURTEC)

## 2024-05-09 ENCOUNTER — TELEPHONE (OUTPATIENT)
Age: 49
End: 2024-05-09

## 2024-06-05 ENCOUNTER — OFFICE VISIT (OUTPATIENT)
Age: 49
End: 2024-06-05

## 2024-06-05 VITALS
TEMPERATURE: 98.4 F | DIASTOLIC BLOOD PRESSURE: 90 MMHG | BODY MASS INDEX: 29.57 KG/M2 | SYSTOLIC BLOOD PRESSURE: 129 MMHG | HEART RATE: 87 BPM | OXYGEN SATURATION: 98 % | WEIGHT: 175 LBS

## 2024-06-05 DIAGNOSIS — J01.90 ACUTE BACTERIAL SINUSITIS: Primary | ICD-10-CM

## 2024-06-05 DIAGNOSIS — R03.0 ELEVATED BLOOD PRESSURE READING IN OFFICE WITH WHITE COAT SYNDROME, WITHOUT DIAGNOSIS OF HYPERTENSION: ICD-10-CM

## 2024-06-05 DIAGNOSIS — J02.9 SORE THROAT: ICD-10-CM

## 2024-06-05 DIAGNOSIS — B96.89 ACUTE BACTERIAL SINUSITIS: Primary | ICD-10-CM

## 2024-06-05 DIAGNOSIS — R06.02 SOB (SHORTNESS OF BREATH): ICD-10-CM

## 2024-06-05 DIAGNOSIS — T36.95XA ANTIBIOTIC-INDUCED YEAST INFECTION: ICD-10-CM

## 2024-06-05 DIAGNOSIS — B37.9 ANTIBIOTIC-INDUCED YEAST INFECTION: ICD-10-CM

## 2024-06-05 LAB
STREP PYOGENES DNA, POC: NEGATIVE
VALID INTERNAL CONTROL, POC: NORMAL

## 2024-06-05 RX ORDER — AZITHROMYCIN 250 MG/1
TABLET, FILM COATED ORAL
Qty: 6 TABLET | Refills: 0 | Status: SHIPPED | OUTPATIENT
Start: 2024-06-05 | End: 2024-06-05

## 2024-06-05 RX ORDER — ALBUTEROL SULFATE 90 UG/1
2 AEROSOL, METERED RESPIRATORY (INHALATION) EVERY 6 HOURS PRN
Qty: 18 G | Refills: 0 | Status: SHIPPED | OUTPATIENT
Start: 2024-06-05

## 2024-06-05 RX ORDER — DOXYCYCLINE HYCLATE 100 MG
100 TABLET ORAL 2 TIMES DAILY
Qty: 20 TABLET | Refills: 0 | Status: SHIPPED | OUTPATIENT
Start: 2024-06-05 | End: 2024-06-15

## 2024-06-05 RX ORDER — FLUCONAZOLE 150 MG/1
150 TABLET ORAL ONCE
Qty: 1 TABLET | Refills: 0 | Status: SHIPPED | OUTPATIENT
Start: 2024-06-05 | End: 2024-06-05

## 2024-06-05 NOTE — PROGRESS NOTES
Chey Lay (:  1975) is a 48 y.o. female,Established patient, here for evaluation of the following chief complaint(s):  Cough (Sinus pain, right ear pain, sore throat , cough, fever and chills x 1 week)       ASSESSMENT/PLAN:  1. Acute bacterial sinusitis  -     doxycycline hyclate (VIBRA-TABS) 100 MG tablet; Take 1 tablet by mouth 2 times daily for 10 days, Disp-20 tablet, R-0Normal  2. Sore throat  -     AMB POC STREP GO A DIRECT, DNA PROBE  -     Strep A culture, throat; Future  3. SOB (shortness of breath)  -     XR CHEST PA/LAT; Future  -     albuterol sulfate HFA (PROVENTIL;VENTOLIN;PROAIR) 108 (90 Base) MCG/ACT inhaler; Inhale 2 puffs into the lungs every 6 hours as needed for Wheezing, Disp-18 g, R-0Normal  4. Antibiotic-induced yeast infection  -     fluconazole (DIFLUCAN) 150 MG tablet; Take 1 tablet by mouth once for 1 dose, Disp-1 tablet, R-0Normal        Results for orders placed or performed in visit on 24   AMB POC STREP GO A DIRECT, DNA PROBE   Result Value Ref Range    Valid Internal Control, POC VALID     Strep pyogenes DNA, POC Negative    Call or return to clinic if no improvement or any worsening  Patient comfortable with plan     XR CHEST PA/LAT   FINDINGS:     No focal airspace consolidation.     No pleural effusion evident.      No pneumothroax evident.      The cardiomediastinal silhouette is within normal limits.     No acute bony abnormality.      Soft tissues are unremarkable.     IMPRESSION:     No acute pulmonary findings.      SUBJECTIVE/OBJECTIVE:      48 y.o. female presents with symptoms of Ear Pain  Patient complains of ear pain and possible ear infection. Symptoms include bilateral ear drainage  and bilateral ear pain. Onset of symptoms was a few days ago, gradually worsening since that time. She also c/o 2 weeks of  congestion, facial pain, nasal congestion, non productive cough, post nasal drip, sinus pressure, and sore throat.  She is drinking plenty of

## 2024-06-08 LAB — S PYO THROAT QL CULT: ABNORMAL

## 2024-07-11 ENCOUNTER — TELEPHONE (OUTPATIENT)
Age: 49
End: 2024-07-11

## 2024-07-17 ENCOUNTER — TELEPHONE (OUTPATIENT)
Age: 49
End: 2024-07-17

## 2024-07-17 ENCOUNTER — OFFICE VISIT (OUTPATIENT)
Age: 49
End: 2024-07-17
Payer: COMMERCIAL

## 2024-07-17 VITALS
HEIGHT: 65 IN | TEMPERATURE: 98.1 F | RESPIRATION RATE: 16 BRPM | SYSTOLIC BLOOD PRESSURE: 112 MMHG | DIASTOLIC BLOOD PRESSURE: 84 MMHG | HEART RATE: 96 BPM | BODY MASS INDEX: 28.06 KG/M2 | WEIGHT: 168.4 LBS | OXYGEN SATURATION: 98 %

## 2024-07-17 DIAGNOSIS — M25.551 PAIN OF RIGHT HIP: Primary | ICD-10-CM

## 2024-07-17 DIAGNOSIS — R30.0 DYSURIA: ICD-10-CM

## 2024-07-17 LAB
BILIRUBIN, URINE, POC: ABNORMAL
BLOOD URINE, POC: NEGATIVE
GLUCOSE URINE, POC: ABNORMAL
KETONES, URINE, POC: ABNORMAL
LEUKOCYTE ESTERASE, URINE, POC: ABNORMAL
NITRITE, URINE, POC: NEGATIVE
PH, URINE, POC: 6.5 (ref 4.6–8)
PROTEIN,URINE, POC: ABNORMAL
SPECIFIC GRAVITY, URINE, POC: 1.01 (ref 1–1.03)
URINALYSIS CLARITY, POC: CLEAR
URINALYSIS COLOR, POC: YELLOW
UROBILINOGEN, POC: ABNORMAL

## 2024-07-17 PROCEDURE — 81003 URINALYSIS AUTO W/O SCOPE: CPT | Performed by: FAMILY MEDICINE

## 2024-07-17 PROCEDURE — 99213 OFFICE O/P EST LOW 20 MIN: CPT | Performed by: FAMILY MEDICINE

## 2024-07-17 RX ORDER — NAPROXEN 500 MG/1
500 TABLET ORAL 2 TIMES DAILY WITH MEALS
Qty: 30 TABLET | Refills: 0 | Status: SHIPPED | OUTPATIENT
Start: 2024-07-17

## 2024-07-17 RX ORDER — ACETAMINOPHEN 500 MG
1000 TABLET ORAL 3 TIMES DAILY
Qty: 180 TABLET | Refills: 0 | Status: SHIPPED | OUTPATIENT
Start: 2024-07-17

## 2024-07-17 ASSESSMENT — PATIENT HEALTH QUESTIONNAIRE - PHQ9
SUM OF ALL RESPONSES TO PHQ QUESTIONS 1-9: 0
SUM OF ALL RESPONSES TO PHQ9 QUESTIONS 1 & 2: 0
SUM OF ALL RESPONSES TO PHQ QUESTIONS 1-9: 0
1. LITTLE INTEREST OR PLEASURE IN DOING THINGS: NOT AT ALL
2. FEELING DOWN, DEPRESSED OR HOPELESS: NOT AT ALL
SUM OF ALL RESPONSES TO PHQ QUESTIONS 1-9: 0
SUM OF ALL RESPONSES TO PHQ QUESTIONS 1-9: 0

## 2024-07-17 NOTE — PROGRESS NOTES
Identified pt with two pt identifiers(name and ).    Chief Complaint   Patient presents with    Back Pain     She c/o of lower back pain and hip (bursa) area.         Health Maintenance Due   Topic    Hepatitis B vaccine (1 of 3 - 3-dose series)    DTaP/Tdap/Td vaccine (1 - Tdap)    Lipids     Colorectal Cancer Screen     COVID-19 Vaccine (3 - 2023-24 season)       Wt Readings from Last 3 Encounters:   24 79.4 kg (175 lb)   24 78 kg (172 lb)   24 78.2 kg (172 lb 6.4 oz)     Temp Readings from Last 3 Encounters:   24 98.4 °F (36.9 °C) (Oral)   24 98.8 °F (37.1 °C) (Temporal)   24 98 °F (36.7 °C) (Temporal)     BP Readings from Last 3 Encounters:   24 (!) 129/90   24 110/72   24 110/78     Pulse Readings from Last 3 Encounters:   24 87   24 88   24 96           Depression Screening:  :         2024     3:57 PM 2024     4:48 PM 2023     9:25 AM   PHQ-9 Questionaire   Little interest or pleasure in doing things 0 0 0   Feeling down, depressed, or hopeless 0 0 0   PHQ-9 Total Score 0 0 0        Fall Risk Assessment:  :          No data to display                 Abuse Screening:  :          No data to display                 Coordination of Care Questionnaire:  :     \"Have you been to the ER, urgent care clinic since your last visit?  Hospitalized since your last visit?\"    NO    “Have you seen or consulted any other health care providers outside of [facility] since your last visit?”    NO        “Have you had a colorectal cancer screening such as a colonoscopy/FIT/Cologuard?    NO    No colonoscopy on file  No cologuard on file  No FIT/FOBT on file   No flexible sigmoidoscopy on file         Click Here for Release of Records Request

## 2024-07-20 ASSESSMENT — ENCOUNTER SYMPTOMS
SINUS PRESSURE: 0
SORE THROAT: 0
VOMITING: 0
SINUS PAIN: 0
ABDOMINAL DISTENTION: 0
BACK PAIN: 1
COUGH: 0
CHEST TIGHTNESS: 0
SHORTNESS OF BREATH: 0
CONSTIPATION: 0
BLOOD IN STOOL: 0
ANAL BLEEDING: 0
RHINORRHEA: 0
ABDOMINAL PAIN: 0
DIARRHEA: 0
NAUSEA: 0
WHEEZING: 0

## 2024-07-20 NOTE — PROGRESS NOTES
Assessment & Plan   1. Pain of right hip  -     XR HIP BILATERAL W AP PELVIS (2 VIEWS); Future  -     XR FEMUR RIGHT (MIN 2 VIEWS); Future  -     naproxen (NAPROSYN) 500 MG tablet; Take 1 tablet by mouth 2 times daily (with meals), Disp-30 tablet, R-0Normal  -     acetaminophen (TYLENOL) 500 MG tablet; Take 2 tablets by mouth 3 times daily, Disp-180 tablet, R-0Normal  2. Dysuria  -     AMB POC URINALYSIS DIP STICK AUTO W/O MICRO       No follow-ups on file.       Subjective   Chey Lay (:  1975) is a 48 y.o. female,Established patient, here for evaluation of the following chief complaint(s):  Back Pain (She c/o of lower back pain and hip (bursa) area. )      Chey Lay is a 48 y.o. female presents with new onset hip pain, right side.     She is unable to put direct pressure on the hip with out extreme pain, denies any limited movement.     Back Pain  Pertinent negatives include no abdominal pain, chest pain, fever or pelvic pain.       Review of Systems   Constitutional:  Negative for activity change, appetite change, fatigue and fever.   HENT:  Negative for congestion, postnasal drip, rhinorrhea, sinus pressure, sinus pain, sneezing and sore throat.    Respiratory:  Negative for cough, chest tightness, shortness of breath and wheezing.    Cardiovascular:  Negative for chest pain, palpitations and leg swelling.   Gastrointestinal:  Negative for abdominal distention, abdominal pain, anal bleeding, blood in stool, constipation, diarrhea, nausea and vomiting.   Endocrine: Negative for cold intolerance, heat intolerance, polydipsia, polyphagia and polyuria.   Genitourinary:  Negative for dyspareunia, genital sores, hematuria, menstrual problem, pelvic pain, vaginal bleeding, vaginal discharge and vaginal pain.   Musculoskeletal:  Positive for back pain. Negative for arthralgias, gait problem, joint swelling and neck pain.   Skin:  Negative for pallor, rash and wound.   Allergic/Immunologic:

## 2024-07-24 ENCOUNTER — TELEPHONE (OUTPATIENT)
Age: 49
End: 2024-07-24

## 2024-07-24 NOTE — TELEPHONE ENCOUNTER
Called patient to schedule new patient appt. She stated that she will have to call back to schedule due to her potentially [place] out of [place] for work and not having insurance for the time frame of the appt.

## 2024-07-29 DIAGNOSIS — J30.9 ALLERGIC RHINITIS, UNSPECIFIED SEASONALITY, UNSPECIFIED TRIGGER: ICD-10-CM

## 2024-07-30 ENCOUNTER — TELEPHONE (OUTPATIENT)
Age: 49
End: 2024-07-30

## 2024-07-30 DIAGNOSIS — C44.320 SQUAMOUS CELL CARCINOMA, FACE: Primary | ICD-10-CM

## 2024-07-30 RX ORDER — IPRATROPIUM BROMIDE 42 UG/1
SPRAY, METERED NASAL
Qty: 15 ML | Refills: 5 | Status: SHIPPED | OUTPATIENT
Start: 2024-07-30

## 2024-07-30 NOTE — TELEPHONE ENCOUNTER
----- Message from Lesly Ceron sent at 7/30/2024  8:03 AM EDT -----  Regarding: FW: Referral Dermatologist   Contact: [phone]    ----- Message -----  From: Chey Lay  Sent: 7/29/2024  10:02 PM EDT  To: [facility] Clinical Staff  Subject: Referral Dermatologist                           Hi there I just got home today from vacation and had papers in the mail from dermatologist office. I am scheduled to have the mohs surgery this Thursday Aug 1st . I have squamous cell cancer on my face.  In the paperwork it states that I need to get a referral from my pcp for the surgeon in their office ( Dr. Ban Garcia ) . I don’t understand why the dermatologist office doesn’t do this. They haven’t mentioned this prior to me. Please get back with me as soon as possible!

## 2024-07-30 NOTE — TELEPHONE ENCOUNTER
I placed referral for Dr Ban Garcia to do Moh's surgery on skin cancer on face. Please fax referral to Dr Garcia's office.  Does she need authorization from her insurance?

## 2024-08-05 ENCOUNTER — TELEPHONE (OUTPATIENT)
Age: 49
End: 2024-08-05

## 2024-08-05 DIAGNOSIS — F41.8 TEST ANXIETY: Primary | ICD-10-CM

## 2024-08-05 NOTE — TELEPHONE ENCOUNTER
Patient is requesting  some medication to help her get through her MRI procedure on 8/17/24    Please send to :    [facility]  [phone]

## 2024-08-06 ENCOUNTER — TELEPHONE (OUTPATIENT)
Age: 49
End: 2024-08-06

## 2024-08-06 RX ORDER — DIAZEPAM 10 MG/1
TABLET ORAL
Qty: 1 TABLET | Refills: 0 | Status: SHIPPED | OUTPATIENT
Start: 2024-08-06 | End: 2025-08-07

## 2024-08-06 NOTE — TELEPHONE ENCOUNTER
----- Message from Yessy Lugo MD sent at 8/5/2024  5:30 PM EDT -----  Please request lab results from [facility] Chris Estrada in [place], [place].    Thanks.

## 2024-08-13 ENCOUNTER — PROCEDURE VISIT (OUTPATIENT)
Age: 49
End: 2024-08-13
Payer: COMMERCIAL

## 2024-08-13 DIAGNOSIS — G56.01 CARPAL TUNNEL SYNDROME OF RIGHT WRIST: Primary | ICD-10-CM

## 2024-08-13 DIAGNOSIS — G43.909 MIGRAINE WITHOUT STATUS MIGRAINOSUS, NOT INTRACTABLE, UNSPECIFIED MIGRAINE TYPE: ICD-10-CM

## 2024-08-13 PROCEDURE — 95913 NRV CNDJ TEST 13/> STUDIES: CPT | Performed by: PSYCHIATRY & NEUROLOGY

## 2024-08-13 RX ORDER — ELETRIPTAN HYDROBROMIDE 40 MG/1
TABLET, FILM COATED ORAL
Qty: 9 TABLET | Refills: 5 | Status: SHIPPED | OUTPATIENT
Start: 2024-08-13

## 2024-08-13 NOTE — PROGRESS NOTES
Sensory (5th Digit)  29.7 °C   Wrist    3.0 <4.0 77.6 >9 Wrist 5th Digit 14.0   Right Ulnar Anti Sensory (5th Digit)  29.7 °C   Wrist    2.9 <4.0 49.8 >9 Wrist 5th Digit 14.0     Motor Summary Table     Stim Site NR Onset (ms) Norm Onset (ms) O-P Amp (mV) Norm O-P Amp Amp (Prev) (%) Site1 Site2 Dist (cm) Adama (m/s) Norm Adama (m/s)   Left Median Motor (Abd Poll Brev)  30 °C   Wrist    3.0 <4.5 16.1 >4.1 100.0 Wrist Abd Poll Brev 8.0     Elbow    6.3  13.5  83.9 Elbow Wrist 18.5 56 >49   Right Median Motor (Abd Poll Brev)  30.1 °C   Wrist    4.0 <4.5 10.9 >4.1 100.0 Wrist Abd Poll Brev 8.0     Elbow    7.3  9.8  89.9 Elbow Wrist 17.0 52 >49   Left Ulnar Motor (Abd Dig Minimi)  30.4 °C   Wrist    2.6 <3.1 12.3 >7.0 100.0 Wrist Abd Dig Minimi 8.0  >50   B Elbow    5.6  12.2  99.2 B Elbow Wrist 18.0 60 >50   A Elbow    7.3  12.6  103.3 A Elbow B Elbow 10.0 59 >50   Right Ulnar Motor (Abd Dig Minimi)  30.4 °C   Wrist    2.7 <3.1 14.8 >7.0 100.0 Wrist Abd Dig Minimi 8.0  >50   B Elbow    5.5  14.4  97.3 B Elbow Wrist 17.0 61 >50   A Elbow    7.1  14.3  99.3 A Elbow B Elbow 10.0 63 >50     Comparison Summary Table     Stim Site NR Peak (ms) P-T Amp (µV) Site1 Site2 Dist (cm) Delta-0 (ms)   Left Median/Ulnar Palm Comparison (Wrist)  29.9 °C   Median Palm    1.6 100.7 Median Palm Ulnar Palm 8.0 0.1   Ulnar Palm    1.7 38.8       Right Median/Ulnar Palm Comparison (Wrist)  30.3 °C   Median Palm    2.2 52.3 Median Palm Ulnar Palm 8.0 0.5   Ulnar Palm    1.7 33.1         F Wave Studies     NR F-Lat (ms) Lat Norm (ms) L-R F-Lat (ms) L-R Lat Norm   Left Ulnar (Mrkrs) (Abd Dig Min)  30.5 °C      24.12 <32 0.44 <2.5   Right Ulnar (Mrkrs) (Abd Dig Min)  30.5 °C      24.56 <32 0.44 <2.5         Waveforms:

## 2024-08-29 ENCOUNTER — TELEPHONE (OUTPATIENT)
Age: 49
End: 2024-08-29

## 2024-08-29 NOTE — TELEPHONE ENCOUNTER
VM to pt- rescheduled for 9/19 @ 4pm. Advised to return call to the office if this appt date does not work for her. My Chart message also sent.

## 2024-09-12 ENCOUNTER — OFFICE VISIT (OUTPATIENT)
Age: 49
End: 2024-09-12
Payer: COMMERCIAL

## 2024-09-12 VITALS
BODY MASS INDEX: 27.99 KG/M2 | RESPIRATION RATE: 14 BRPM | HEART RATE: 86 BPM | DIASTOLIC BLOOD PRESSURE: 82 MMHG | HEIGHT: 65 IN | WEIGHT: 168 LBS | OXYGEN SATURATION: 97 % | SYSTOLIC BLOOD PRESSURE: 131 MMHG

## 2024-09-12 DIAGNOSIS — M54.30 SCIATIC NERVE PAIN, UNSPECIFIED LATERALITY: ICD-10-CM

## 2024-09-12 DIAGNOSIS — G93.5 CHIARI I MALFORMATION (HCC): ICD-10-CM

## 2024-09-12 DIAGNOSIS — G43.011 INTRACTABLE MIGRAINE WITHOUT AURA AND WITH STATUS MIGRAINOSUS: Primary | ICD-10-CM

## 2024-09-12 PROCEDURE — 99214 OFFICE O/P EST MOD 30 MIN: CPT | Performed by: PSYCHIATRY & NEUROLOGY

## 2024-09-12 RX ORDER — RIMEGEPANT SULFATE 75 MG/75MG
TABLET, ORALLY DISINTEGRATING ORAL
Qty: 16 TABLET | Refills: 5 | Status: SHIPPED | OUTPATIENT
Start: 2024-09-12

## 2024-09-16 ENCOUNTER — TELEPHONE (OUTPATIENT)
Age: 49
End: 2024-09-16

## 2024-09-27 ENCOUNTER — HOSPITAL ENCOUNTER (OUTPATIENT)
Facility: HOSPITAL | Age: 49
Discharge: HOME | End: 2024-09-30
Attending: FAMILY MEDICINE
Payer: COMMERCIAL

## 2024-09-27 VITALS — BODY MASS INDEX: 28.68 KG/M2 | HEIGHT: 64 IN | WEIGHT: 168 LBS

## 2024-09-27 DIAGNOSIS — Z12.31 ENCOUNTER FOR SCREENING MAMMOGRAM FOR MALIGNANT NEOPLASM OF BREAST: ICD-10-CM

## 2024-09-27 PROCEDURE — 77063 BREAST TOMOSYNTHESIS BI: CPT

## 2024-12-05 DIAGNOSIS — J30.9 ALLERGIC RHINITIS, UNSPECIFIED SEASONALITY, UNSPECIFIED TRIGGER: ICD-10-CM

## 2024-12-06 RX ORDER — IPRATROPIUM BROMIDE 42 UG/1
SPRAY, METERED NASAL
Qty: 15 ML | Refills: 1 | Status: SHIPPED | OUTPATIENT
Start: 2024-12-06

## 2024-12-19 ENCOUNTER — TELEPHONE (OUTPATIENT)
Age: 49
End: 2024-12-19

## 2024-12-19 NOTE — TELEPHONE ENCOUNTER
Her last OV was 5/8/24. She did not get the fasting labs that I ordered drawn for that visit.  She needs an OV and labs to address her medical concerns. See if she can get appt on the dates she is available over the next 2 weeks.

## 2024-12-20 NOTE — TELEPHONE ENCOUNTER
Please explain to pt that I am unable to address her medical questions without seeing her or having lab results to review.  She can transfer to a PCP in [place] if that is where she lives.

## 2024-12-30 ENCOUNTER — TELEPHONE (OUTPATIENT)
Age: 49
End: 2024-12-30

## 2024-12-30 DIAGNOSIS — Z13.89 SCREENING FOR MULTIPLE CONDITIONS: Primary | ICD-10-CM

## 2025-01-22 ENCOUNTER — COMMUNITY OUTREACH (OUTPATIENT)
Age: 50
End: 2025-01-22

## 2025-01-25 ENCOUNTER — HOSPITAL ENCOUNTER (EMERGENCY)
Facility: HOSPITAL | Age: 50
Discharge: HOME | End: 2025-01-25
Attending: EMERGENCY MEDICINE
Payer: COMMERCIAL

## 2025-01-25 VITALS
RESPIRATION RATE: 18 BRPM | TEMPERATURE: 98.7 F | HEIGHT: 64 IN | WEIGHT: 180 LBS | DIASTOLIC BLOOD PRESSURE: 85 MMHG | BODY MASS INDEX: 30.73 KG/M2 | HEART RATE: 92 BPM | SYSTOLIC BLOOD PRESSURE: 146 MMHG | OXYGEN SATURATION: 99 %

## 2025-01-25 DIAGNOSIS — R21 RASH AND OTHER NONSPECIFIC SKIN ERUPTION: Primary | ICD-10-CM

## 2025-01-25 DIAGNOSIS — L29.9 PRURITIC DISORDER: ICD-10-CM

## 2025-01-25 PROCEDURE — 99283 EMERGENCY DEPT VISIT LOW MDM: CPT

## 2025-01-25 RX ORDER — HYDROXYZINE HYDROCHLORIDE 25 MG/1
25 TABLET, FILM COATED ORAL EVERY 8 HOURS PRN
Qty: 30 TABLET | Refills: 0 | Status: SHIPPED | OUTPATIENT
Start: 2025-01-25 | End: 2025-01-25

## 2025-01-25 RX ORDER — PREDNISONE 20 MG/1
20 TABLET ORAL DAILY
Qty: 5 TABLET | Refills: 0 | Status: SHIPPED | OUTPATIENT
Start: 2025-01-25 | End: 2025-01-27 | Stop reason: SINTOL

## 2025-01-25 RX ORDER — HYDROXYZINE HYDROCHLORIDE 25 MG/1
25 TABLET, FILM COATED ORAL EVERY 8 HOURS PRN
Qty: 30 TABLET | Refills: 0 | Status: SHIPPED | OUTPATIENT
Start: 2025-01-25 | End: 2025-02-04

## 2025-01-25 RX ORDER — PREDNISONE 20 MG/1
20 TABLET ORAL DAILY
Qty: 5 TABLET | Refills: 0 | Status: SHIPPED | OUTPATIENT
Start: 2025-01-25 | End: 2025-01-25

## 2025-01-25 ASSESSMENT — PAIN - FUNCTIONAL ASSESSMENT
PAIN_FUNCTIONAL_ASSESSMENT: NONE - DENIES PAIN
PAIN_FUNCTIONAL_ASSESSMENT: ACTIVITIES ARE NOT PREVENTED
PAIN_FUNCTIONAL_ASSESSMENT: 0-10
PAIN_FUNCTIONAL_ASSESSMENT: NONE - DENIES PAIN

## 2025-01-25 ASSESSMENT — PAIN DESCRIPTION - LOCATION: LOCATION: GENERALIZED

## 2025-01-25 ASSESSMENT — PAIN DESCRIPTION - PAIN TYPE: TYPE: ACUTE PAIN

## 2025-01-25 ASSESSMENT — PAIN DESCRIPTION - DESCRIPTORS: DESCRIPTORS: PINS AND NEEDLES

## 2025-01-25 ASSESSMENT — PAIN SCALES - GENERAL: PAINLEVEL_OUTOF10: 7

## 2025-01-25 NOTE — DISCHARGE INSTRUCTIONS
You can take Atarax during the day and Benadryl at night for itching.  I recommend the steroids as directed and follow-up with your rheumatologist.  Return with any new or worsening symptoms

## 2025-01-25 NOTE — ED PROVIDER NOTES
[facility]  EMERGENCY DEPARTMENT ENCOUNTER      Pt Name: Chey Lay  MRN: [id number]  Birthdate 1975  Date of evaluation: 1/25/2025  Provider: Anthony Campa MD      HISTORY OF PRESENT ILLNESS      HPI        Nursing Notes were reviewed.    REVIEW OF SYSTEMS         Review of Systems        PAST MEDICAL HISTORY     Past Medical History:   Diagnosis Date    Chronic back pain     Fibromyalgia     GERD (gastroesophageal reflux disease)     For years    H/O Sjogren's disease (HCC)     Hashimoto's disease     Headache     Started as a child    Hearing loss     4 years or so?    Hypothyroidism 2007    Hoshimotos hypothyroidism    Lupus (HCC)     Osteoarthritis          SURGICAL HISTORY       Past Surgical History:   Procedure Laterality Date    LAPAROSCOPY      RIght overy and tube removed and endeometriosis removal         CURRENT MEDICATIONS       Current Discharge Medication List        CONTINUE these medications which have NOT CHANGED    Details   ipratropium (ATROVENT) 0.06 % nasal spray SPRAY 2 SPRAYS INTO EACH NOSTRIL 4 TIMES A DAY  Qty: 15 mL, Refills: 1    Associated Diagnoses: Allergic rhinitis, unspecified seasonality, unspecified trigger      rimegepant sulfate (NURTEC) 75 MG TBDP 1 po qod  Qty: 16 tablet, Refills: 5    Associated Diagnoses: Intractable migraine without aura and with status migrainosus      eletriptan (RELPAX) 40 MG tablet TAKE 1 TABLET AT ONSET OF HEADACHE, MAY REPEAT IN 2 HOURS  Qty: 9 tablet, Refills: 5    Associated Diagnoses: Migraine without status migrainosus, not intractable, unspecified migraine type      diazePAM (VALIUM) 10 MG tablet Take 1 tablet 45 minutes prior to MRI. Will need a .  Qty: 1 tablet, Refills: 0    Associated Diagnoses: Test anxiety      naproxen (NAPROSYN) 500 MG tablet Take 1 tablet by mouth 2 times daily (with meals)  Qty: 30 tablet, Refills: 0    Associated Diagnoses: Pain of right hip      acetaminophen (TYLENOL) 500

## 2025-01-27 ENCOUNTER — LAB (OUTPATIENT)
Age: 50
End: 2025-01-27

## 2025-01-27 ENCOUNTER — OFFICE VISIT (OUTPATIENT)
Age: 50
End: 2025-01-27
Payer: COMMERCIAL

## 2025-01-27 VITALS
RESPIRATION RATE: 16 BRPM | OXYGEN SATURATION: 97 % | DIASTOLIC BLOOD PRESSURE: 84 MMHG | HEIGHT: 65 IN | TEMPERATURE: 98.6 F | BODY MASS INDEX: 30.16 KG/M2 | WEIGHT: 181 LBS | HEART RATE: 92 BPM | SYSTOLIC BLOOD PRESSURE: 122 MMHG

## 2025-01-27 DIAGNOSIS — D35.2 PITUITARY MICROADENOMA (HCC): ICD-10-CM

## 2025-01-27 DIAGNOSIS — R21 RASH AND OTHER NONSPECIFIC SKIN ERUPTION: ICD-10-CM

## 2025-01-27 DIAGNOSIS — T78.40XD ALLERGIC REACTION, SUBSEQUENT ENCOUNTER: Primary | ICD-10-CM

## 2025-01-27 DIAGNOSIS — T78.40XD ALLERGIC REACTION, SUBSEQUENT ENCOUNTER: ICD-10-CM

## 2025-01-27 DIAGNOSIS — M32.8 OTHER FORMS OF SYSTEMIC LUPUS ERYTHEMATOSUS, UNSPECIFIED ORGAN INVOLVEMENT STATUS (HCC): ICD-10-CM

## 2025-01-27 PROCEDURE — 99213 OFFICE O/P EST LOW 20 MIN: CPT | Performed by: FAMILY MEDICINE

## 2025-01-27 RX ORDER — FAMOTIDINE 20 MG/1
20 TABLET, FILM COATED ORAL 2 TIMES DAILY
Qty: 60 TABLET | Refills: 0 | Status: SHIPPED | OUTPATIENT
Start: 2025-01-27

## 2025-01-27 RX ORDER — METHYLPREDNISOLONE 4 MG/1
TABLET ORAL
Qty: 21 TABLET | Refills: 0 | Status: SHIPPED | OUTPATIENT
Start: 2025-01-27 | End: 2025-02-02

## 2025-01-27 SDOH — ECONOMIC STABILITY: FOOD INSECURITY: WITHIN THE PAST 12 MONTHS, THE FOOD YOU BOUGHT JUST DIDN'T LAST AND YOU DIDN'T HAVE MONEY TO GET MORE.: NEVER TRUE

## 2025-01-27 SDOH — ECONOMIC STABILITY: FOOD INSECURITY: WITHIN THE PAST 12 MONTHS, YOU WORRIED THAT YOUR FOOD WOULD RUN OUT BEFORE YOU GOT MONEY TO BUY MORE.: NEVER TRUE

## 2025-01-27 ASSESSMENT — PATIENT HEALTH QUESTIONNAIRE - PHQ9
SUM OF ALL RESPONSES TO PHQ QUESTIONS 1-9: 0
SUM OF ALL RESPONSES TO PHQ9 QUESTIONS 1 & 2: 0
2. FEELING DOWN, DEPRESSED OR HOPELESS: NOT AT ALL
1. LITTLE INTEREST OR PLEASURE IN DOING THINGS: NOT AT ALL
SUM OF ALL RESPONSES TO PHQ QUESTIONS 1-9: 0

## 2025-01-27 NOTE — PROGRESS NOTES
Identified pt with two pt identifiers(name and )    Chief Complaint   Patient presents with    Rash     Rash and swelling of hands, feet, and lips that started about 6 days ago.         Health Maintenance Due   Topic    Hepatitis B vaccine (1 of 3 - 19+ 3-dose series)    DTaP/Tdap/Td vaccine (1 - Tdap)    Lipids     Colorectal Cancer Screen     Flu vaccine (1)    COVID-19 Vaccine (3 - 2023-24 season)       Wt Readings from Last 3 Encounters:   25 82.1 kg (181 lb)   25 81.6 kg (180 lb)   24 76.2 kg (168 lb)     Temp Readings from Last 3 Encounters:   25 98.6 °F (37 °C) (Temporal)   25 98.7 °F (37.1 °C) (Oral)   24 98.1 °F (36.7 °C) (Temporal)     BP Readings from Last 3 Encounters:   25 122/84   25 (!) 146/85   24 131/82     Pulse Readings from Last 3 Encounters:   25 92   25 92   24 86           Depression Screening:  :         2025     2:59 PM 2024     3:57 PM 2024     4:48 PM 2023     9:25 AM   PHQ-9 Questionaire   Little interest or pleasure in doing things 0 0 0 0   Feeling down, depressed, or hopeless 0 0 0 0   PHQ-9 Total Score 0 0 0 0        Fall Risk Assessment:  :          No data to display                 Abuse Screening:  :          No data to display                 Coordination of Care Questionnaire:  :     \"Have you been to the ER, urgent care clinic since your last visit?  Hospitalized since your last visit?\"      2025 for itching    “Have you seen or consulted any other health care providers outside our system since your last visit?”      Rheumatology in [place] Chris Estrada    “Have you had a colorectal cancer screening such as a colonoscopy/FIT/Cologuard?    NO    No colonoscopy on file  No cologuard on file  No FIT/FOBT on file   No flexible sigmoidoscopy on file       Click Here for Release of Records Request

## 2025-01-28 ENCOUNTER — PATIENT MESSAGE (OUTPATIENT)
Age: 50
End: 2025-01-28

## 2025-01-28 LAB
ALBUMIN SERPL-MCNC: 3.8 G/DL (ref 3.5–5)
ALBUMIN/GLOB SERPL: 1.1 (ref 1.1–2.2)
ALP SERPL-CCNC: 60 U/L (ref 45–117)
ALT SERPL-CCNC: 117 U/L (ref 12–78)
ANION GAP SERPL CALC-SCNC: 4 MMOL/L (ref 2–12)
AST SERPL-CCNC: 32 U/L (ref 15–37)
BASOPHILS # BLD: 0.12 K/UL (ref 0–0.1)
BASOPHILS NFR BLD: 2.3 % (ref 0–1)
BILIRUB SERPL-MCNC: 0.3 MG/DL (ref 0.2–1)
BUN SERPL-MCNC: 10 MG/DL (ref 6–20)
BUN/CREAT SERPL: 10 (ref 12–20)
CALCIUM SERPL-MCNC: 9.2 MG/DL (ref 8.5–10.1)
CHLORIDE SERPL-SCNC: 104 MMOL/L (ref 97–108)
CO2 SERPL-SCNC: 30 MMOL/L (ref 21–32)
CREAT SERPL-MCNC: 0.96 MG/DL (ref 0.55–1.02)
DIFFERENTIAL METHOD BLD: ABNORMAL
EOSINOPHIL # BLD: 0.14 K/UL (ref 0–0.4)
EOSINOPHIL NFR BLD: 2.7 % (ref 0–7)
ERYTHROCYTE [DISTWIDTH] IN BLOOD BY AUTOMATED COUNT: 12 % (ref 11.5–14.5)
GLOBULIN SER CALC-MCNC: 3.4 G/DL (ref 2–4)
GLUCOSE SERPL-MCNC: 91 MG/DL (ref 65–100)
HCT VFR BLD AUTO: 39.6 % (ref 35–47)
HGB BLD-MCNC: 13.1 G/DL (ref 11.5–16)
IMM GRANULOCYTES # BLD AUTO: 0.07 K/UL (ref 0–0.04)
IMM GRANULOCYTES NFR BLD AUTO: 1.4 % (ref 0–0.5)
LYMPHOCYTES # BLD: 2.09 K/UL (ref 0.8–3.5)
LYMPHOCYTES NFR BLD: 40.7 % (ref 12–49)
MCH RBC QN AUTO: 31 PG (ref 26–34)
MCHC RBC AUTO-ENTMCNC: 33.1 G/DL (ref 30–36.5)
MCV RBC AUTO: 93.8 FL (ref 80–99)
MONOCYTES # BLD: 0.44 K/UL (ref 0–1)
MONOCYTES NFR BLD: 8.6 % (ref 5–13)
NEUTS SEG # BLD: 2.28 K/UL (ref 1.8–8)
NEUTS SEG NFR BLD: 44.3 % (ref 32–75)
NRBC # BLD: 0 K/UL (ref 0–0.01)
NRBC BLD-RTO: 0 PER 100 WBC
PLATELET # BLD AUTO: 246 K/UL (ref 150–400)
PMV BLD AUTO: 11.4 FL (ref 8.9–12.9)
POTASSIUM SERPL-SCNC: 4.1 MMOL/L (ref 3.5–5.1)
PROT SERPL-MCNC: 7.2 G/DL (ref 6.4–8.2)
RBC # BLD AUTO: 4.22 M/UL (ref 3.8–5.2)
SODIUM SERPL-SCNC: 138 MMOL/L (ref 136–145)
WBC # BLD AUTO: 5.1 K/UL (ref 3.6–11)

## 2025-01-29 ENCOUNTER — TELEPHONE (OUTPATIENT)
Age: 50
End: 2025-01-29

## 2025-01-29 DIAGNOSIS — D72.824 BASOPHILIA: Primary | ICD-10-CM

## 2025-01-29 DIAGNOSIS — R74.8 ELEVATED LIVER ENZYMES: ICD-10-CM

## 2025-01-29 NOTE — TELEPHONE ENCOUNTER
Repeat lab orders in 2 weeks.  She needs lab appt.    Please make sure recent labs are faxed to her [facility] Dr Chris Estrada in [place].

## 2025-01-29 NOTE — TELEPHONE ENCOUNTER
----- Message from Dr. Yessy Lugo MD sent at 1/28/2025  8:45 PM EST -----  Labs show normal blood cell counts. One of the liver enzymes is elevated. Recommend abstain from alcohol and plan to recheck again in 2 weeks.

## 2025-01-29 NOTE — TELEPHONE ENCOUNTER
I spoke to patient and faxed labs to [phone]. I let her know to have labs repeated in two weeks and we would call her with results and recommendations. She understood.

## 2025-01-29 NOTE — PROGRESS NOTES
Chey Lay (:  1975) is a 49 y.o. female,Established patient, here for evaluation of the following chief complaint(s):  Rash (Rash and swelling of hands, feet, and lips that started about 6 days ago. )        ASSESSMENT/PLAN:  1. Allergic reaction, subsequent encounter  -     methylPREDNISolone (MEDROL DOSEPACK) 4 MG tablet; Take by mouth., Disp-21 tablet, R-0Normal  -     famotidine (PEPCID) 20 MG tablet; Take 1 tablet by mouth 2 times daily, Disp-60 tablet, R-0Normal  -     CBC with Auto Differential; Future  -     Comprehensive Metabolic Panel; Future  2. Rash and other nonspecific skin eruption  -     famotidine (PEPCID) 20 MG tablet; Take 1 tablet by mouth 2 times daily, Disp-60 tablet, R-0Normal  -     CBC with Auto Differential; Future  -     Comprehensive Metabolic Panel; Future  3. Pituitary microadenoma (HCC)  4. Other forms of systemic lupus erythematosus, unspecified organ involvement status (HCC)    Order labs.  Send results to her rheumatologist in [place] Dr. Chris Estrada.  Order Medrol Dosepak.    No follow-ups on file.      SUBJECTIVE/OBJECTIVE:  HPI    Complaining of rash that started about 6 days ago and has gradually spread throughout her body including hands, feet, torso, back.  Very itchy.  She noticed some swelling of her lips.  The skin is very sensitive.  No history of any new medicines, diet, new soaps or detergents.  Patient was seen in the ER .  Prescribed hydroxyzine and prednisone but patient states she cannot take prednisone due to side effects.  She has been taking Benadryl and the rash has calmed down.    Current Outpatient Medications   Medication Sig Dispense Refill    methylPREDNISolone (MEDROL DOSEPACK) 4 MG tablet Take by mouth. 21 tablet 0    famotidine (PEPCID) 20 MG tablet Take 1 tablet by mouth 2 times daily 60 tablet 0    ipratropium (ATROVENT) 0.06 % nasal spray SPRAY 2 SPRAYS INTO EACH NOSTRIL 4 TIMES A DAY 15 mL 1    rimegepant sulfate (NURTEC) 75

## 2025-01-30 ENCOUNTER — TELEPHONE (OUTPATIENT)
Age: 50
End: 2025-01-30

## 2025-01-30 LAB — IGE SERPL-ACNC: 24 IU/ML (ref 6–495)

## 2025-01-30 NOTE — TELEPHONE ENCOUNTER
Sent a [employer] message with results-patient is active in [employer].      Patient seen in [facility]

## 2025-02-03 ENCOUNTER — TELEPHONE (OUTPATIENT)
Age: 50
End: 2025-02-03

## 2025-02-03 DIAGNOSIS — R74.8 ELEVATED LIVER ENZYMES: Primary | ICD-10-CM

## 2025-02-03 DIAGNOSIS — R21 RASH AND OTHER NONSPECIFIC SKIN ERUPTION: ICD-10-CM

## 2025-02-03 NOTE — TELEPHONE ENCOUNTER
Pls see pt's last message.  I placed referral to GI but I ma not sure where she wants to go. Please ask pt and fax referral to GI clinic she prefers along with last OV and labs.  Who is following her thyroid level and refilling Synthroid?

## 2025-02-16 DIAGNOSIS — J30.9 ALLERGIC RHINITIS, UNSPECIFIED SEASONALITY, UNSPECIFIED TRIGGER: ICD-10-CM

## 2025-02-18 DIAGNOSIS — R21 RASH AND OTHER NONSPECIFIC SKIN ERUPTION: ICD-10-CM

## 2025-02-18 DIAGNOSIS — T78.40XD ALLERGIC REACTION, SUBSEQUENT ENCOUNTER: ICD-10-CM

## 2025-02-18 RX ORDER — FAMOTIDINE 20 MG/1
20 TABLET, FILM COATED ORAL 2 TIMES DAILY
Qty: 60 TABLET | Refills: 1 | Status: SHIPPED | OUTPATIENT
Start: 2025-02-18

## 2025-02-18 RX ORDER — IPRATROPIUM BROMIDE 42 UG/1
SPRAY, METERED NASAL
Qty: 45 EACH | Refills: 1 | Status: SHIPPED | OUTPATIENT
Start: 2025-02-18

## 2025-03-11 ENCOUNTER — TELEPHONE (OUTPATIENT)
Age: 50
End: 2025-03-11

## 2025-03-17 DIAGNOSIS — G43.011 INTRACTABLE MIGRAINE WITHOUT AURA AND WITH STATUS MIGRAINOSUS: ICD-10-CM

## 2025-03-18 RX ORDER — RIMEGEPANT SULFATE 75 MG/75MG
1 TABLET, ORALLY DISINTEGRATING ORAL EVERY OTHER DAY
Qty: 16 TABLET | Refills: 5 | Status: ACTIVE | OUTPATIENT
Start: 2025-03-18

## 2025-03-18 RX ORDER — OMEPRAZOLE 40 MG/1
CAPSULE, DELAYED RELEASE ORAL
Qty: 90 CAPSULE | Refills: 3 | Status: SHIPPED | OUTPATIENT
Start: 2025-03-18

## 2025-06-02 DIAGNOSIS — J30.9 ALLERGIC RHINITIS, UNSPECIFIED SEASONALITY, UNSPECIFIED TRIGGER: ICD-10-CM

## 2025-06-03 RX ORDER — IPRATROPIUM BROMIDE 42 UG/1
SPRAY, METERED NASAL
Qty: 45 ML | Refills: 1 | Status: SHIPPED | OUTPATIENT
Start: 2025-06-03

## 2025-07-22 ENCOUNTER — TELEPHONE (OUTPATIENT)
Age: 50
End: 2025-07-22

## 2025-07-22 NOTE — TELEPHONE ENCOUNTER
Spoke with the patient and scheduled a follow up with Dr. Mirza on 10/9/2025 at 2:30 PM.   Time-based billing (NON-critical care)

## 2025-08-04 DIAGNOSIS — G43.909 MIGRAINE WITHOUT STATUS MIGRAINOSUS, NOT INTRACTABLE, UNSPECIFIED MIGRAINE TYPE: ICD-10-CM

## 2025-08-04 RX ORDER — ELETRIPTAN HYDROBROMIDE 40 MG/1
TABLET, FILM COATED ORAL
Qty: 9 TABLET | Refills: 0 | Status: SHIPPED | OUTPATIENT
Start: 2025-08-04

## 2025-08-22 ENCOUNTER — TELEPHONE (OUTPATIENT)
Age: 50
End: 2025-08-22